# Patient Record
Sex: FEMALE | Race: WHITE | URBAN - METROPOLITAN AREA
[De-identification: names, ages, dates, MRNs, and addresses within clinical notes are randomized per-mention and may not be internally consistent; named-entity substitution may affect disease eponyms.]

---

## 2017-06-06 ENCOUNTER — OFFICE VISIT (OUTPATIENT)
Dept: FAMILY MEDICINE CLINIC | Facility: CLINIC | Age: 67
End: 2017-06-06

## 2017-06-06 VITALS
DIASTOLIC BLOOD PRESSURE: 73 MMHG | HEART RATE: 64 BPM | SYSTOLIC BLOOD PRESSURE: 115 MMHG | TEMPERATURE: 98 F | WEIGHT: 120 LBS

## 2017-06-06 DIAGNOSIS — M25.552 PAIN OF LEFT HIP JOINT: Primary | ICD-10-CM

## 2017-06-06 PROCEDURE — 99213 OFFICE O/P EST LOW 20 MIN: CPT | Performed by: FAMILY MEDICINE

## 2017-06-06 PROCEDURE — 99212 OFFICE O/P EST SF 10 MIN: CPT | Performed by: FAMILY MEDICINE

## 2017-06-07 NOTE — PROGRESS NOTES
HPI:    Patient ID: Dewayne Mcbride is a 78year old female. HPI Comments: Patient started having some thigh pain radiating to the left leg since few months ago. Feels some soft tissue growth in the area also.  Not sure if it is bigger, but did no

## 2017-06-08 ENCOUNTER — TELEPHONE (OUTPATIENT)
Dept: FAMILY MEDICINE CLINIC | Facility: CLINIC | Age: 67
End: 2017-06-08

## 2017-06-08 NOTE — TELEPHONE ENCOUNTER
Dr. Mary Valdez, The MRI dept states that only with do the MRI w/o contrast because the patient stated that she had problem with her kidney. So they can no do contrast w/o blood recent blood work.    Just LINDSEY

## 2017-06-09 ENCOUNTER — TELEPHONE (OUTPATIENT)
Dept: FAMILY MEDICINE CLINIC | Facility: CLINIC | Age: 67
End: 2017-06-09

## 2017-06-09 NOTE — TELEPHONE ENCOUNTER
Patient called and stated had MRI done and would like to discuss results  Requesting a call back from nurse

## 2017-06-13 NOTE — TELEPHONE ENCOUNTER
Pt called in to follow up on results. Pt screaming on the phone stating she better get a call back within the next 20 minutes with her results. Pt states someone told her on Friday that the results were received and were awaiting Dr. Stephen Cuevas review.   Spoke

## 2017-06-14 NOTE — TELEPHONE ENCOUNTER
06/13/2017 01:20 PM Fax (Incoming) [id number] [facility]   recv'd MRI results from [facility].  gave to [facility]SB to review     By Iker Luz W Dana Allen, CMA

## 2017-06-14 NOTE — TELEPHONE ENCOUNTER
Pt calling checking status of MRI results done at [facility], pt states she would like call to confirm it was received.  Pt states she would like to speak to

## 2017-06-15 NOTE — TELEPHONE ENCOUNTER
Patient is very upset stating that she has left several messages and would like to speaks to the doctor re: MRI results.

## 2017-06-16 NOTE — TELEPHONE ENCOUNTER
Spoke to the patients daughter. As patient does not speak english. Result of the MRI negative,   i told them that there is a limitation in this test because it was done without contrast.   She will do Ultrasound of the thigh when she goes back to [place].

## 2017-06-16 NOTE — TELEPHONE ENCOUNTER
Pt is calling back, states she expects a call back within an hour. Pt states she has been calling for the past days and has not gotten and call back. Pt stated she knows  is in, pt was informed MD is with patients.  Please advise

## (undated) NOTE — MR AVS SNAPSHOT
[facility] [place] [address] [address]  [id number] [phone]               Thank you for choosing us for your health care visit with Yosef Pina MD.  We are glad to serve you and happy to provide you with this summary of yo Instructions and Information about Your Health     None      Allergies as of Jun 06, 2017     Not on File                Today's Vital Signs     BP Pulse Temp Weight          115/73 mmHg 64 98.1 °F (36.7 °C) (Oral) 120 lb (54.432 kg)           Current Medi Tips for increasing your physical activity – Adults who are physically active are less likely to develop some chronic diseases than adults who are inactive.      HOW TO GET STARTED: HOW TO STAY MOTIVATED:   Start activities slowly and build up over time Do